# Patient Record
Sex: FEMALE | Race: WHITE | NOT HISPANIC OR LATINO
[De-identification: names, ages, dates, MRNs, and addresses within clinical notes are randomized per-mention and may not be internally consistent; named-entity substitution may affect disease eponyms.]

---
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
## (undated) DEVICE — LARGE, DISPOSABLE ALEXIS O C-SECTION PROTECTOR - RETRACTOR: Brand: ALEXIS ® O C-SECTION PROTECTOR - RETRACTOR

## (undated) NOTE — LETTER
Date & Time: 4/23/2022, 1:42 PM  Patient: Ethan Winters  Encounter Provider(s):    Do Herrera MD       To Whom It May Concern:    Ethan Winters was seen and treated in our department on 4/18/2022. She should not return to work in 2 days.     If you have any questions or concerns, please do not hesitate to call.        _____________________________  Physician/APC Signature

## (undated) NOTE — LETTER
Date & Time: 6/27/2025, 8:57 PM  Patient: Darryl Toney  Encounter Provider(s):    Jonathan Burt MD       To Whom It May Concern:    Darryl Toney was seen and treated in our department on 6/26/2025. She should not return to work until June 30th, 2025.    If you have any questions or concerns, please do not hesitate to call.        _____________________________  Physician/APC Signature